# Patient Record
Sex: MALE | Race: WHITE | HISPANIC OR LATINO | URBAN - METROPOLITAN AREA
[De-identification: names, ages, dates, MRNs, and addresses within clinical notes are randomized per-mention and may not be internally consistent; named-entity substitution may affect disease eponyms.]

---

## 2019-12-11 PROBLEM — Z00.00 ENCOUNTER FOR PREVENTIVE HEALTH EXAMINATION: Status: ACTIVE | Noted: 2019-12-11

## 2021-12-01 ENCOUNTER — INPATIENT (INPATIENT)
Facility: HOSPITAL | Age: 36
LOS: 3 days | Discharge: HOME | DRG: 897 | End: 2021-12-05
Attending: INTERNAL MEDICINE | Admitting: INTERNAL MEDICINE
Payer: MEDICAID

## 2021-12-01 VITALS
HEART RATE: 120 BPM | OXYGEN SATURATION: 97 % | SYSTOLIC BLOOD PRESSURE: 132 MMHG | TEMPERATURE: 99 F | DIASTOLIC BLOOD PRESSURE: 75 MMHG | RESPIRATION RATE: 18 BRPM | HEIGHT: 70 IN | WEIGHT: 182.1 LBS

## 2021-12-01 LAB
ALBUMIN SERPL ELPH-MCNC: 3.8 G/DL — SIGNIFICANT CHANGE UP (ref 3.5–5)
ALP SERPL-CCNC: 40 U/L — SIGNIFICANT CHANGE UP (ref 40–120)
ALT FLD-CCNC: 38 U/L DA — SIGNIFICANT CHANGE UP (ref 10–60)
ANION GAP SERPL CALC-SCNC: 11 MMOL/L — SIGNIFICANT CHANGE UP (ref 5–17)
APAP SERPL-MCNC: <2 UG/ML — LOW (ref 10–30)
AST SERPL-CCNC: 42 U/L — HIGH (ref 10–40)
BASOPHILS # BLD AUTO: 0.05 K/UL — SIGNIFICANT CHANGE UP (ref 0–0.2)
BASOPHILS NFR BLD AUTO: 0.5 % — SIGNIFICANT CHANGE UP (ref 0–2)
BILIRUB SERPL-MCNC: 0.7 MG/DL — SIGNIFICANT CHANGE UP (ref 0.2–1.2)
BUN SERPL-MCNC: 11 MG/DL — SIGNIFICANT CHANGE UP (ref 7–18)
CALCIUM SERPL-MCNC: 9 MG/DL — SIGNIFICANT CHANGE UP (ref 8.4–10.5)
CHLORIDE SERPL-SCNC: 103 MMOL/L — SIGNIFICANT CHANGE UP (ref 96–108)
CO2 SERPL-SCNC: 24 MMOL/L — SIGNIFICANT CHANGE UP (ref 22–31)
CREAT SERPL-MCNC: 0.9 MG/DL — SIGNIFICANT CHANGE UP (ref 0.5–1.3)
EOSINOPHIL # BLD AUTO: 0 K/UL — SIGNIFICANT CHANGE UP (ref 0–0.5)
EOSINOPHIL NFR BLD AUTO: 0 % — SIGNIFICANT CHANGE UP (ref 0–6)
ETHANOL SERPL-MCNC: 47 MG/DL — HIGH (ref 0–10)
GLUCOSE SERPL-MCNC: 93 MG/DL — SIGNIFICANT CHANGE UP (ref 70–99)
HCT VFR BLD CALC: 41.2 % — SIGNIFICANT CHANGE UP (ref 39–50)
HGB BLD-MCNC: 14.6 G/DL — SIGNIFICANT CHANGE UP (ref 13–17)
IMM GRANULOCYTES NFR BLD AUTO: 0.5 % — SIGNIFICANT CHANGE UP (ref 0–1.5)
LYMPHOCYTES # BLD AUTO: 2.31 K/UL — SIGNIFICANT CHANGE UP (ref 1–3.3)
LYMPHOCYTES # BLD AUTO: 24.8 % — SIGNIFICANT CHANGE UP (ref 13–44)
MAGNESIUM SERPL-MCNC: 1.8 MG/DL — SIGNIFICANT CHANGE UP (ref 1.6–2.6)
MCHC RBC-ENTMCNC: 31.6 PG — SIGNIFICANT CHANGE UP (ref 27–34)
MCHC RBC-ENTMCNC: 35.4 GM/DL — SIGNIFICANT CHANGE UP (ref 32–36)
MCV RBC AUTO: 89.2 FL — SIGNIFICANT CHANGE UP (ref 80–100)
MONOCYTES # BLD AUTO: 0.87 K/UL — SIGNIFICANT CHANGE UP (ref 0–0.9)
MONOCYTES NFR BLD AUTO: 9.3 % — SIGNIFICANT CHANGE UP (ref 2–14)
NEUTROPHILS # BLD AUTO: 6.05 K/UL — SIGNIFICANT CHANGE UP (ref 1.8–7.4)
NEUTROPHILS NFR BLD AUTO: 64.9 % — SIGNIFICANT CHANGE UP (ref 43–77)
NRBC # BLD: 0 /100 WBCS — SIGNIFICANT CHANGE UP (ref 0–0)
PLATELET # BLD AUTO: 232 K/UL — SIGNIFICANT CHANGE UP (ref 150–400)
POTASSIUM SERPL-MCNC: 3.3 MMOL/L — LOW (ref 3.5–5.3)
POTASSIUM SERPL-SCNC: 3.3 MMOL/L — LOW (ref 3.5–5.3)
PROT SERPL-MCNC: 7.8 G/DL — SIGNIFICANT CHANGE UP (ref 6–8.3)
RBC # BLD: 4.62 M/UL — SIGNIFICANT CHANGE UP (ref 4.2–5.8)
RBC # FLD: 12.9 % — SIGNIFICANT CHANGE UP (ref 10.3–14.5)
SALICYLATES SERPL-MCNC: <1.7 MG/DL — LOW (ref 2.8–20)
SODIUM SERPL-SCNC: 138 MMOL/L — SIGNIFICANT CHANGE UP (ref 135–145)
WBC # BLD: 9.33 K/UL — SIGNIFICANT CHANGE UP (ref 3.8–10.5)
WBC # FLD AUTO: 9.33 K/UL — SIGNIFICANT CHANGE UP (ref 3.8–10.5)

## 2021-12-01 PROCEDURE — 99285 EMERGENCY DEPT VISIT HI MDM: CPT | Mod: CS

## 2021-12-01 RX ORDER — SODIUM CHLORIDE 9 MG/ML
1000 INJECTION INTRAMUSCULAR; INTRAVENOUS; SUBCUTANEOUS ONCE
Refills: 0 | Status: COMPLETED | OUTPATIENT
Start: 2021-12-01 | End: 2021-12-01

## 2021-12-01 RX ADMIN — SODIUM CHLORIDE 1000 MILLILITER(S): 9 INJECTION INTRAMUSCULAR; INTRAVENOUS; SUBCUTANEOUS at 22:47

## 2021-12-01 RX ADMIN — SODIUM CHLORIDE 1000 MILLILITER(S): 9 INJECTION INTRAMUSCULAR; INTRAVENOUS; SUBCUTANEOUS at 21:47

## 2021-12-01 RX ADMIN — Medication 2 MILLIGRAM(S): at 23:08

## 2021-12-01 NOTE — ED ADULT TRIAGE NOTE - CCCP TRG CHIEF CMPLNT
c/o palpitations /anxiety attack '' I have been drinking a lot lately  and feels dehydrated'' denies suicidal thoughts/palpitations

## 2021-12-01 NOTE — ED PROVIDER NOTE - CLINICAL SUMMARY MEDICAL DECISION MAKING FREE TEXT BOX
Patient presenting with tremor and palpitation. states used meth but say it was his friend breathing it towards him. no meth usage himself. clinically likely withdrawal., will obtain lab, treat withdrawal, reassess

## 2021-12-01 NOTE — ED PROVIDER NOTE - OBJECTIVE STATEMENT
35 y.o w/ pmh of alcohol abuse presenting with palpitation and anxiety. denies si/hi. endorses drinking less today than usual. denies n, v, abd pain, fever, chills. endorses also using meth today.

## 2021-12-01 NOTE — ED ADULT TRIAGE NOTE - CHIEF COMPLAINT QUOTE
c/o palpitations /anxiety attack /'' I have been drinking  a lot lately and feels dehydrated ''denies suicidal thoughts

## 2021-12-01 NOTE — ED ADULT NURSE NOTE - NSIMPLEMENTINTERV_GEN_ALL_ED
Implemented All Universal Safety Interventions:  [place] to call system. Call bell, personal items and telephone within reach. Instruct patient to call for assistance. Room bathroom lighting operational. Non-slip footwear when patient is off stretcher. Physically safe environment: no spills, clutter or unnecessary equipment. Stretcher in lowest position, wheels locked, appropriate side rails in place.

## 2021-12-01 NOTE — ED ADULT NURSE NOTE - OBJECTIVE STATEMENT
Pt. c/o palpitations and feeling "like my mouth is dry and I am dehydrated" after taking meth today for the first time. Pt. stated "I think I am having a panic attack where I feel my heart beating fast". Pt. admits to drinking alcohol every day "about a bottle of vodka". Last drink was at 11 today. "I am trying to stop".

## 2021-12-02 DIAGNOSIS — F10.230 ALCOHOL DEPENDENCE WITH WITHDRAWAL, UNCOMPLICATED: ICD-10-CM

## 2021-12-02 DIAGNOSIS — Z29.9 ENCOUNTER FOR PROPHYLACTIC MEASURES, UNSPECIFIED: ICD-10-CM

## 2021-12-02 DIAGNOSIS — F10.10 ALCOHOL ABUSE, UNCOMPLICATED: ICD-10-CM

## 2021-12-02 DIAGNOSIS — F15.10 OTHER STIMULANT ABUSE, UNCOMPLICATED: ICD-10-CM

## 2021-12-02 DIAGNOSIS — E80.7 DISORDER OF BILIRUBIN METABOLISM, UNSPECIFIED: ICD-10-CM

## 2021-12-02 DIAGNOSIS — F32.9 MAJOR DEPRESSIVE DISORDER, SINGLE EPISODE, UNSPECIFIED: ICD-10-CM

## 2021-12-02 LAB
ALBUMIN SERPL ELPH-MCNC: 3.3 G/DL — LOW (ref 3.5–5)
ALP SERPL-CCNC: 36 U/L — LOW (ref 40–120)
ALT FLD-CCNC: 31 U/L DA — SIGNIFICANT CHANGE UP (ref 10–60)
AMPHET UR-MCNC: POSITIVE
ANION GAP SERPL CALC-SCNC: 9 MMOL/L — SIGNIFICANT CHANGE UP (ref 5–17)
AST SERPL-CCNC: 35 U/L — SIGNIFICANT CHANGE UP (ref 10–40)
BARBITURATES UR SCN-MCNC: NEGATIVE — SIGNIFICANT CHANGE UP
BASOPHILS # BLD AUTO: 0.03 K/UL — SIGNIFICANT CHANGE UP (ref 0–0.2)
BASOPHILS NFR BLD AUTO: 0.5 % — SIGNIFICANT CHANGE UP (ref 0–2)
BENZODIAZ UR-MCNC: NEGATIVE — SIGNIFICANT CHANGE UP
BILIRUB DIRECT SERPL-MCNC: 0.3 MG/DL — SIGNIFICANT CHANGE UP (ref 0–0.3)
BILIRUB INDIRECT FLD-MCNC: 1 MG/DL — SIGNIFICANT CHANGE UP (ref 0.2–1)
BILIRUB SERPL-MCNC: 1.3 MG/DL — HIGH (ref 0.2–1.2)
BILIRUB SERPL-MCNC: 1.3 MG/DL — HIGH (ref 0.2–1.2)
BUN SERPL-MCNC: 9 MG/DL — SIGNIFICANT CHANGE UP (ref 7–18)
CALCIUM SERPL-MCNC: 8.5 MG/DL — SIGNIFICANT CHANGE UP (ref 8.4–10.5)
CHLORIDE SERPL-SCNC: 105 MMOL/L — SIGNIFICANT CHANGE UP (ref 96–108)
CO2 SERPL-SCNC: 27 MMOL/L — SIGNIFICANT CHANGE UP (ref 22–31)
COCAINE METAB.OTHER UR-MCNC: NEGATIVE — SIGNIFICANT CHANGE UP
CREAT SERPL-MCNC: 0.66 MG/DL — SIGNIFICANT CHANGE UP (ref 0.5–1.3)
EOSINOPHIL # BLD AUTO: 0.03 K/UL — SIGNIFICANT CHANGE UP (ref 0–0.5)
EOSINOPHIL NFR BLD AUTO: 0.5 % — SIGNIFICANT CHANGE UP (ref 0–6)
GGT SERPL-CCNC: 80 U/L — HIGH (ref 9–50)
GLUCOSE SERPL-MCNC: 90 MG/DL — SIGNIFICANT CHANGE UP (ref 70–99)
HAV IGM SER-ACNC: SIGNIFICANT CHANGE UP
HBV CORE IGM SER-ACNC: SIGNIFICANT CHANGE UP
HBV SURFACE AG SER-ACNC: SIGNIFICANT CHANGE UP
HCT VFR BLD CALC: 40.8 % — SIGNIFICANT CHANGE UP (ref 39–50)
HCV AB S/CO SERPL IA: 0.15 S/CO — SIGNIFICANT CHANGE UP (ref 0–0.99)
HCV AB SERPL-IMP: SIGNIFICANT CHANGE UP
HGB BLD-MCNC: 14.1 G/DL — SIGNIFICANT CHANGE UP (ref 13–17)
IMM GRANULOCYTES NFR BLD AUTO: 0.4 % — SIGNIFICANT CHANGE UP (ref 0–1.5)
LYMPHOCYTES # BLD AUTO: 1.54 K/UL — SIGNIFICANT CHANGE UP (ref 1–3.3)
LYMPHOCYTES # BLD AUTO: 27.8 % — SIGNIFICANT CHANGE UP (ref 13–44)
MAGNESIUM SERPL-MCNC: 2.1 MG/DL — SIGNIFICANT CHANGE UP (ref 1.6–2.6)
MCHC RBC-ENTMCNC: 31.8 PG — SIGNIFICANT CHANGE UP (ref 27–34)
MCHC RBC-ENTMCNC: 34.6 GM/DL — SIGNIFICANT CHANGE UP (ref 32–36)
MCV RBC AUTO: 92.1 FL — SIGNIFICANT CHANGE UP (ref 80–100)
METHADONE UR-MCNC: NEGATIVE — SIGNIFICANT CHANGE UP
MONOCYTES # BLD AUTO: 0.67 K/UL — SIGNIFICANT CHANGE UP (ref 0–0.9)
MONOCYTES NFR BLD AUTO: 12.1 % — SIGNIFICANT CHANGE UP (ref 2–14)
NEUTROPHILS # BLD AUTO: 3.24 K/UL — SIGNIFICANT CHANGE UP (ref 1.8–7.4)
NEUTROPHILS NFR BLD AUTO: 58.7 % — SIGNIFICANT CHANGE UP (ref 43–77)
NRBC # BLD: 0 /100 WBCS — SIGNIFICANT CHANGE UP (ref 0–0)
OPIATES UR-MCNC: NEGATIVE — SIGNIFICANT CHANGE UP
PCP SPEC-MCNC: SIGNIFICANT CHANGE UP
PCP UR-MCNC: NEGATIVE — SIGNIFICANT CHANGE UP
PHOSPHATE SERPL-MCNC: 3 MG/DL — SIGNIFICANT CHANGE UP (ref 2.5–4.5)
PLATELET # BLD AUTO: 182 K/UL — SIGNIFICANT CHANGE UP (ref 150–400)
POTASSIUM SERPL-MCNC: 3.8 MMOL/L — SIGNIFICANT CHANGE UP (ref 3.5–5.3)
POTASSIUM SERPL-SCNC: 3.8 MMOL/L — SIGNIFICANT CHANGE UP (ref 3.5–5.3)
PROT SERPL-MCNC: 6.8 G/DL — SIGNIFICANT CHANGE UP (ref 6–8.3)
RBC # BLD: 4.43 M/UL — SIGNIFICANT CHANGE UP (ref 4.2–5.8)
RBC # FLD: 12.9 % — SIGNIFICANT CHANGE UP (ref 10.3–14.5)
SARS-COV-2 RNA SPEC QL NAA+PROBE: SIGNIFICANT CHANGE UP
SODIUM SERPL-SCNC: 141 MMOL/L — SIGNIFICANT CHANGE UP (ref 135–145)
THC UR QL: POSITIVE
WBC # BLD: 5.53 K/UL — SIGNIFICANT CHANGE UP (ref 3.8–10.5)
WBC # FLD AUTO: 5.53 K/UL — SIGNIFICANT CHANGE UP (ref 3.8–10.5)

## 2021-12-02 PROCEDURE — 12345: CPT | Mod: NC,GC

## 2021-12-02 PROCEDURE — 99222 1ST HOSP IP/OBS MODERATE 55: CPT

## 2021-12-02 PROCEDURE — 76705 ECHO EXAM OF ABDOMEN: CPT | Mod: 26

## 2021-12-02 RX ORDER — THIAMINE MONONITRATE (VIT B1) 100 MG
500 TABLET ORAL EVERY 8 HOURS
Refills: 0 | Status: COMPLETED | OUTPATIENT
Start: 2021-12-02 | End: 2021-12-04

## 2021-12-02 RX ORDER — PANTOPRAZOLE SODIUM 20 MG/1
40 TABLET, DELAYED RELEASE ORAL
Refills: 0 | Status: DISCONTINUED | OUTPATIENT
Start: 2021-12-02 | End: 2021-12-05

## 2021-12-02 RX ORDER — SODIUM CHLORIDE 9 MG/ML
1000 INJECTION, SOLUTION INTRAVENOUS
Refills: 0 | Status: DISCONTINUED | OUTPATIENT
Start: 2021-12-02 | End: 2021-12-04

## 2021-12-02 RX ORDER — INFLUENZA VIRUS VACCINE 15; 15; 15; 15 UG/.5ML; UG/.5ML; UG/.5ML; UG/.5ML
0.5 SUSPENSION INTRAMUSCULAR ONCE
Refills: 0 | Status: DISCONTINUED | OUTPATIENT
Start: 2021-12-02 | End: 2021-12-05

## 2021-12-02 RX ORDER — THIAMINE MONONITRATE (VIT B1) 100 MG
100 TABLET ORAL DAILY
Refills: 0 | Status: DISCONTINUED | OUTPATIENT
Start: 2021-12-02 | End: 2021-12-02

## 2021-12-02 RX ORDER — FLUOXETINE HCL 10 MG
1 CAPSULE ORAL
Qty: 0 | Refills: 0 | DISCHARGE

## 2021-12-02 RX ORDER — MULTIVIT-MIN/FERROUS GLUCONATE 9 MG/15 ML
1 LIQUID (ML) ORAL DAILY
Refills: 0 | Status: DISCONTINUED | OUTPATIENT
Start: 2021-12-02 | End: 2021-12-05

## 2021-12-02 RX ORDER — SODIUM CHLORIDE 9 MG/ML
1000 INJECTION, SOLUTION INTRAVENOUS
Refills: 0 | Status: DISCONTINUED | OUTPATIENT
Start: 2021-12-02 | End: 2021-12-02

## 2021-12-02 RX ORDER — FOLIC ACID 0.8 MG
1 TABLET ORAL DAILY
Refills: 0 | Status: DISCONTINUED | OUTPATIENT
Start: 2021-12-02 | End: 2021-12-05

## 2021-12-02 RX ADMIN — PANTOPRAZOLE SODIUM 40 MILLIGRAM(S): 20 TABLET, DELAYED RELEASE ORAL at 06:15

## 2021-12-02 RX ADMIN — Medication 1 MILLIGRAM(S): at 13:23

## 2021-12-02 RX ADMIN — Medication 105 MILLIGRAM(S): at 13:22

## 2021-12-02 RX ADMIN — Medication 105 MILLIGRAM(S): at 06:15

## 2021-12-02 RX ADMIN — Medication 105 MILLIGRAM(S): at 22:01

## 2021-12-02 RX ADMIN — SODIUM CHLORIDE 125 MILLILITER(S): 9 INJECTION, SOLUTION INTRAVENOUS at 06:15

## 2021-12-02 RX ADMIN — Medication 50 MILLIGRAM(S): at 13:22

## 2021-12-02 RX ADMIN — SODIUM CHLORIDE 150 MILLILITER(S): 9 INJECTION, SOLUTION INTRAVENOUS at 02:51

## 2021-12-02 RX ADMIN — Medication 50 MILLIGRAM(S): at 00:47

## 2021-12-02 RX ADMIN — Medication 2 MILLIGRAM(S): at 09:32

## 2021-12-02 RX ADMIN — SODIUM CHLORIDE 125 MILLILITER(S): 9 INJECTION, SOLUTION INTRAVENOUS at 22:01

## 2021-12-02 RX ADMIN — Medication 50 MILLIGRAM(S): at 06:15

## 2021-12-02 RX ADMIN — Medication 50 MILLIGRAM(S): at 17:54

## 2021-12-02 RX ADMIN — Medication 1 TABLET(S): at 13:22

## 2021-12-02 NOTE — H&P ADULT - PROBLEM SELECTOR PLAN 2
Pt endorses smoking meth around 11am on 12/1  developed generalized anxiety, palpitations, nausea, vomiting, headache after  also uses Fluoxetine at home  monitor for serotonin syndrome  would hold Fluoxetine for 48 hrs until Meth clears

## 2021-12-02 NOTE — PROGRESS NOTE ADULT - SUBJECTIVE AND OBJECTIVE BOX
PGY-1 Progress Note discussed with attending    PAGER #: [[phone]] TILL 5:00 PM  PLEASE CONTACT ON CALL TEAM:  - On Call Team (Please refer to Buffy) FROM 5:00 PM - 8:30PM  - Nightfloat Team FROM 8:30 -7:30 AM      INTERVAL HPI/OVERNIGHT EVENTS:       REVIEW OF SYSTEMS:  CONSTITUTIONAL: No fever, weight loss, or fatigue  RESPIRATORY: No cough, wheezing, chills or hemoptysis; No shortness of breath  CARDIOVASCULAR: No chest pain, palpitations, dizziness, or leg swelling  GASTROINTESTINAL: No abdominal pain. No nausea, vomiting, or hematemesis; No diarrhea or constipation. No melena or hematochezia.  GENITOURINARY: No dysuria or hematuria, urinary frequency  NEUROLOGICAL: No headaches, memory loss, loss of strength, numbness, or tremors  SKIN: No itching, burning, rashes, or lesions     Vital Signs Last 24 Hrs  T(C): 36.7 (02 Dec 2021 04:59), Max: 37.2 (01 Dec 2021 21:07)  T(F): 98.1 (02 Dec 2021 04:59), Max: 99 (01 Dec 2021 21:07)  HR: 102 (02 Dec 2021 04:59) (102 - 120)  BP: 98/64 (02 Dec 2021 04:59) (98/64 - 132/75)  BP(mean): --  RR: 18 (02 Dec 2021 04:59) (18 - 18)  SpO2: 99% (02 Dec 2021 04:59) (97% - 99%)    PHYSICAL EXAMINATION:  GENERAL: NAD, well built  HEAD:  Atraumatic, Normocephalic  EYES:  conjunctiva and sclera clear  NECK: Supple, No JVD, Normal thyroid  CHEST/LUNG: Clear to auscultation. Clear to percussion bilaterally; No rales, rhonchi, wheezing, or rubs  HEART: Regular rate and rhythm; No murmurs, rubs, or gallops  ABDOMEN: Soft, Nontender, Nondistended; Bowel sounds present  NERVOUS SYSTEM:  Alert & Oriented X3,    EXTREMITIES:  2+ Peripheral Pulses, No clubbing, cyanosis, or edema  SKIN: warm dry                          14.1   5.53  )-----------( 182      ( 02 Dec 2021 06:57 )             40.8     12-02    141  |  105  |  9   ----------------------------<  90  3.8   |  27  |  0.66    Ca    8.5      02 Dec 2021 06:57  Phos  3.0     12-02  Mg     2.1     12-02    TPro  6.8  /  Alb  3.3<L>  /  TBili  1.3<H>  /  DBili  0.3  /  AST  35  /  ALT  31  /  AlkPhos  36<L>  12-02    LIVER FUNCTIONS - ( 02 Dec 2021 06:57 )  Alb: 3.3 g/dL / Pro: 6.8 g/dL / ALK PHOS: 36 U/L / ALT: 31 U/L DA / AST: 35 U/L / GGT: x                   CAPILLARY BLOOD GLUCOSE      RADIOLOGY & ADDITIONAL TESTS:                   PGY-1 Progress Note discussed with attending    PAGER #: [[phone]] TILL 5:00 PM  PLEASE CONTACT ON CALL TEAM:  - On Call Team (Please refer to Buffy) FROM 5:00 PM - 8:30PM  - Nightfloat Team FROM 8:30 -7:30 AM      INTERVAL HPI/OVERNIGHT EVENTS:  No overnight events. Pt see and examined by bedside. No HA, denies N/V, abdominal pain, denies palpitations, no longer feeling anxious.  Denies auditory, visual  hallucinations      REVIEW OF SYSTEMS:  CONSTITUTIONAL: No fever, weight loss, or fatigue  RESPIRATORY: No cough, wheezing, chills or hemoptysis; No shortness of breath  CARDIOVASCULAR: No chest pain, palpitations, dizziness, or leg swelling  GASTROINTESTINAL: No abdominal pain. No nausea, vomiting, or hematemesis; No diarrhea or constipation. No melena or hematochezia.  GENITOURINARY: No dysuria or hematuria, urinary frequency  NEUROLOGICAL: No headaches, memory loss, loss of strength, numbness, or tremors  SKIN: No itching, burning, rashes, or lesions     Vital Signs Last 24 Hrs  T(C): 36.7 (02 Dec 2021 04:59), Max: 37.2 (01 Dec 2021 21:07)  T(F): 98.1 (02 Dec 2021 04:59), Max: 99 (01 Dec 2021 21:07)  HR: 102 (02 Dec 2021 04:59) (102 - 120)  BP: 98/64 (02 Dec 2021 04:59) (98/64 - 132/75)  BP(mean): --  RR: 18 (02 Dec 2021 04:59) (18 - 18)  SpO2: 99% (02 Dec 2021 04:59) (97% - 99%)    PHYSICAL EXAMINATION:  GENERAL: NAD, well built  HEAD:  Atraumatic, Normocephalic  EYES:  conjunctiva and sclera clear  NECK: Supple, No JVD  CHEST/LUNG: Clear to auscultation. Clear to percussion bilaterally; No rales, rhonchi, wheezing, or rubs  HEART: Regular rate and rhythm; No murmurs, rubs, or gallops  ABDOMEN: Soft, Nontender, Nondistended; Bowel sounds present  NERVOUS SYSTEM:  Alert & Oriented X3,  mild tremor on his hands   EXTREMITIES:  2+ Peripheral Pulses, No clubbing, cyanosis, or edema  SKIN: warm dry                          14.1   5.53  )-----------( 182      ( 02 Dec 2021 06:57 )             40.8     12-02    141  |  105  |  9   ----------------------------<  90  3.8   |  27  |  0.66    Ca    8.5      02 Dec 2021 06:57  Phos  3.0     12-02  Mg     2.1     12-02    TPro  6.8  /  Alb  3.3<L>  /  TBili  1.3<H>  /  DBili  0.3  /  AST  35  /  ALT  31  /  AlkPhos  36<L>  12-02    LIVER FUNCTIONS - ( 02 Dec 2021 06:57 )  Alb: 3.3 g/dL / Pro: 6.8 g/dL / ALK PHOS: 36 U/L / ALT: 31 U/L DA / AST: 35 U/L / GGT: x                   CAPILLARY BLOOD GLUCOSE      RADIOLOGY & ADDITIONAL TESTS:

## 2021-12-02 NOTE — H&P ADULT - ATTENDING COMMENTS
Pt seen at bedside  Case discussed with MAR.  35 year old woman with PMH of chronic alcohol abuse, amphetamine abuse, clinical depression, ELIDA who presented to the ED on account of palpitations, anxiety, emesis since reducing his alcohol intake in the last day. Denies seizures or other symptoms on ROS.    Vital Signs Last 24 Hrs  T(C): 36.7 (02 Dec 2021 04:59), Max: 37.2 (01 Dec 2021 21:07)  T(F): 98.1 (02 Dec 2021 04:59), Max: 99 (01 Dec 2021 21:07)  HR: 102 (02 Dec 2021 04:59) (102 - 120)  BP: 98/64 (02 Dec 2021 04:59) (98/64 - 132/75)  RR: 18 (02 Dec 2021 04:59) (18 - 18)  SpO2: 99% (02 Dec 2021 04:59) (97% - 99%)    Labs                        14.6   9.33  )-----------( 232      ( 01 Dec 2021 21:50 )             41.2     12-01    138  |  103  |  11  ----------------------------<  93  3.3<L>   |  24  |  0.90    Ca    9.0      01 Dec 2021 21:50  Mg     1.8     12-01    TPro  7.8  /  Alb  3.8  /  TBili  0.7  /  DBili  x   /  AST  42<H>  /  ALT  38  /  AlkPhos  40  12-01    UTOX  - THC, amphetamine +VE  Alc - 47    CT abd - unremarkable liver and abdomen    Impression   - Acute alcohol withdrawal  - Chronic alcohol abuse  - Clinical depression   - ELIDA    Plan   Admit to Medicine   [facility] protocol   Thiamine 500mg IV q 8 hourly X 3 - 5 days  D5NS @ 100cc/hour   MVI including folate replacement  Alcohol / meth cessation counselling     Other plans as above

## 2021-12-02 NOTE — PATIENT PROFILE ADULT - FALL HARM RISK - UNIVERSAL INTERVENTIONS
Bed in lowest position, wheels locked, appropriate side rails in place/Call bell, personal items and telephone in reach/Instruct patient to call for assistance before getting out of bed or chair/Non-slip footwear when patient is out of bed/[place] to call system/Physically safe environment - no spills, clutter or unnecessary equipment/Purposeful Proactive Rounding/Room/bathroom lighting operational, light cord in reach

## 2021-12-02 NOTE — H&P ADULT - NSHPPHYSICALEXAM_GEN_ALL_CORE
GENERAL: NAD, well-groomed, well-developed  HEAD:  Atraumatic, Normocephalic  EYES: EOMI, PERRLA, conjunctiva and sclera clear  ENMT: No tonsillar erythema, exudates, or enlargement; Moist mucous membranes, Good dentition, No lesions  NECK: Supple, normal appearance, No JVD; Normal thyroid; Trachea midline  NERVOUS SYSTEM:  Alert & Oriented X3,  Motor Strength 5/5 B/L upper and lower extremities, sensation intact  CHEST/LUNG: Lungs clear to auscultation bilaterally, No rales, rhonchi, wheezing   HEART: Regular rate and rhythm; No murmurs, rubs, or gallops  ABDOMEN: Soft, Nontender, Nondistended; Bowel sounds present  EXTREMITIES:  2+ Peripheral Pulses, No clubbing, cyanosis, or edema  LYMPH: No lymphadenopathy noted  SKIN: No rashes or lesions;  Good capillary refill

## 2021-12-02 NOTE — H&P ADULT - HISTORY OF PRESENT ILLNESS
Pt is a 34 y/o F with hx of general anxiety and depression who presented to the hospital with complains of Nausea, vomiting, palpitations and general anxiety. Patient endorses that he has been drinking heavily because of the pandemic, and in general has been trying to cut back, but for the last couple of days he has been drinking around 1L of vodka per day, last drink 12/1 during the day, and around 11am on 12/1 he also smoked meth which he had never done before. Because of this he experienced generalized anxiety, palpitations, headache, nausea and vomiting. He also states that he feels dehydrated. Denies any other complaints.     In Ed: BAL 47, Utox + for amphetamines and thc, was given librium and ativan and 1L bolus.

## 2021-12-02 NOTE — H&P ADULT - NSICDXFAMILYHX_GEN_ALL_CORE_FT
FAMILY HISTORY:  Father  Still living? Unknown  FH: hypercholesterolemia, Age at diagnosis: Age Unknown    Mother  Still living? Unknown  FHx: diabetes mellitus, Age at diagnosis: Age Unknown

## 2021-12-02 NOTE — H&P ADULT - PROBLEM SELECTOR PLAN 3
100% of the time
pt on Fluoxetine at home  recent inhalation of Meth  monitor for serotonin syndrome  would hold Fluoxetine for 48 hrs until Meth clears

## 2021-12-02 NOTE — H&P ADULT - NSHPREVIEWOFSYSTEMS_GEN_ALL_CORE
CONSTITUTIONAL: No fever, weight loss, or fatigue  EYES: No eye pain, visual disturbances, or discharge  ENT:  No difficulty hearing, tinnitus, vertigo; No sinus or throat pain  NECK: No pain or stiffness  RESPIRATORY: No cough, wheezing, chills or hemoptysis; No Shortness of Breath  CARDIOVASCULAR: + palpitations, No chest pain,  passing out, dizziness, or leg swelling  GASTROINTESTINAL: No abdominal or epigastric pain. + nausea, vomiting, no hematemesis; No diarrhea or constipation. No melena or hematochezia.  GENITOURINARY: No dysuria, frequency, hematuria, or incontinence  NEUROLOGICAL: + headaches, No memory loss, loss of strength, numbness, or tremors  SKIN: No itching, burning, rashes, or lesions   LYMPH Nodes: No enlarged glands  ENDOCRINE: No heat or cold intolerance; No hair loss  MUSCULOSKELETAL: No joint pain or swelling; No muscle, back, No extremity pain  PSYCHIATRIC: + anxiety. No depression, mood swings, + difficulty sleeping  HEME/LYMPH: No easy bruising, or bleeding gums  ALLERGY AND IMMUNOLOGIC: No hives or eczema

## 2021-12-02 NOTE — H&P ADULT - PROBLEM SELECTOR PLAN 1
Pt with Hx of daily alcohol use  last drink 1 day PTA  BAL 47  CIWA: 6, (anxious, headache, N/v)  S/p Librium and ativan in ED  Will continue ciwa protocol with librium taper and ativan prn  thiamine, folic acid, mv  IV fluids

## 2021-12-02 NOTE — H&P ADULT - ASSESSMENT
Pt is a 34 y/o F with hx of general anxiety and depression who presented to the hospital with complains of Nausea, vomiting, palpitations and general anxiety. Admitted for alcohol withdrawal and Meth intoxication.

## 2021-12-02 NOTE — PROGRESS NOTE ADULT - ATTENDING COMMENTS
Patient seen and examined this afternoon;    Patient admitted with Alcohol withdrawal and Polysubstance abuse on CIWA protocol stable, comfortably resting, not agitated  Hx Major depression    Plan:  [facility] protocol; IV Thiamine  PO Folate and Multivitamin  Continue to hold Fluoxetine for now;   Follow up RUQ Sono; likely Hepatic steatosis    rest as per PGY1 above  Discussed with PGY1 and PGY2

## 2021-12-03 LAB
ALBUMIN SERPL ELPH-MCNC: 3.2 G/DL — LOW (ref 3.5–5)
ALP SERPL-CCNC: 35 U/L — LOW (ref 40–120)
ALT FLD-CCNC: 30 U/L DA — SIGNIFICANT CHANGE UP (ref 10–60)
ANION GAP SERPL CALC-SCNC: 7 MMOL/L — SIGNIFICANT CHANGE UP (ref 5–17)
AST SERPL-CCNC: 31 U/L — SIGNIFICANT CHANGE UP (ref 10–40)
BASOPHILS # BLD AUTO: 0.01 K/UL — SIGNIFICANT CHANGE UP (ref 0–0.2)
BASOPHILS NFR BLD AUTO: 0.2 % — SIGNIFICANT CHANGE UP (ref 0–2)
BILIRUB SERPL-MCNC: 1.2 MG/DL — SIGNIFICANT CHANGE UP (ref 0.2–1.2)
BUN SERPL-MCNC: 6 MG/DL — LOW (ref 7–18)
CALCIUM SERPL-MCNC: 8.4 MG/DL — SIGNIFICANT CHANGE UP (ref 8.4–10.5)
CHLORIDE SERPL-SCNC: 106 MMOL/L — SIGNIFICANT CHANGE UP (ref 96–108)
CO2 SERPL-SCNC: 28 MMOL/L — SIGNIFICANT CHANGE UP (ref 22–31)
CREAT SERPL-MCNC: 0.7 MG/DL — SIGNIFICANT CHANGE UP (ref 0.5–1.3)
EOSINOPHIL # BLD AUTO: 0.06 K/UL — SIGNIFICANT CHANGE UP (ref 0–0.5)
EOSINOPHIL NFR BLD AUTO: 1 % — SIGNIFICANT CHANGE UP (ref 0–6)
GLUCOSE SERPL-MCNC: 114 MG/DL — HIGH (ref 70–99)
HCT VFR BLD CALC: 40.3 % — SIGNIFICANT CHANGE UP (ref 39–50)
HGB BLD-MCNC: 14.4 G/DL — SIGNIFICANT CHANGE UP (ref 13–17)
IMM GRANULOCYTES NFR BLD AUTO: 0.7 % — SIGNIFICANT CHANGE UP (ref 0–1.5)
LYMPHOCYTES # BLD AUTO: 1.1 K/UL — SIGNIFICANT CHANGE UP (ref 1–3.3)
LYMPHOCYTES # BLD AUTO: 19.2 % — SIGNIFICANT CHANGE UP (ref 13–44)
MAGNESIUM SERPL-MCNC: 2.2 MG/DL — SIGNIFICANT CHANGE UP (ref 1.6–2.6)
MCHC RBC-ENTMCNC: 32.5 PG — SIGNIFICANT CHANGE UP (ref 27–34)
MCHC RBC-ENTMCNC: 35.7 GM/DL — SIGNIFICANT CHANGE UP (ref 32–36)
MCV RBC AUTO: 91 FL — SIGNIFICANT CHANGE UP (ref 80–100)
MONOCYTES # BLD AUTO: 0.51 K/UL — SIGNIFICANT CHANGE UP (ref 0–0.9)
MONOCYTES NFR BLD AUTO: 8.9 % — SIGNIFICANT CHANGE UP (ref 2–14)
NEUTROPHILS # BLD AUTO: 4.02 K/UL — SIGNIFICANT CHANGE UP (ref 1.8–7.4)
NEUTROPHILS NFR BLD AUTO: 70 % — SIGNIFICANT CHANGE UP (ref 43–77)
NRBC # BLD: 0 /100 WBCS — SIGNIFICANT CHANGE UP (ref 0–0)
PHOSPHATE SERPL-MCNC: 2.3 MG/DL — LOW (ref 2.5–4.5)
PLATELET # BLD AUTO: 162 K/UL — SIGNIFICANT CHANGE UP (ref 150–400)
POTASSIUM SERPL-MCNC: 3.3 MMOL/L — LOW (ref 3.5–5.3)
POTASSIUM SERPL-SCNC: 3.3 MMOL/L — LOW (ref 3.5–5.3)
PROT SERPL-MCNC: 6.8 G/DL — SIGNIFICANT CHANGE UP (ref 6–8.3)
RBC # BLD: 4.43 M/UL — SIGNIFICANT CHANGE UP (ref 4.2–5.8)
RBC # FLD: 12.6 % — SIGNIFICANT CHANGE UP (ref 10.3–14.5)
SODIUM SERPL-SCNC: 141 MMOL/L — SIGNIFICANT CHANGE UP (ref 135–145)
WBC # BLD: 5.74 K/UL — SIGNIFICANT CHANGE UP (ref 3.8–10.5)
WBC # FLD AUTO: 5.74 K/UL — SIGNIFICANT CHANGE UP (ref 3.8–10.5)

## 2021-12-03 PROCEDURE — 99233 SBSQ HOSP IP/OBS HIGH 50: CPT | Mod: GC

## 2021-12-03 RX ORDER — SODIUM,POTASSIUM PHOSPHATES 278-250MG
1 POWDER IN PACKET (EA) ORAL THREE TIMES A DAY
Refills: 0 | Status: COMPLETED | OUTPATIENT
Start: 2021-12-03 | End: 2021-12-04

## 2021-12-03 RX ORDER — ACETAMINOPHEN 500 MG
650 TABLET ORAL EVERY 6 HOURS
Refills: 0 | Status: DISCONTINUED | OUTPATIENT
Start: 2021-12-03 | End: 2021-12-05

## 2021-12-03 RX ORDER — POTASSIUM CHLORIDE 20 MEQ
40 PACKET (EA) ORAL ONCE
Refills: 0 | Status: COMPLETED | OUTPATIENT
Start: 2021-12-03 | End: 2021-12-03

## 2021-12-03 RX ORDER — FLUOXETINE HCL 10 MG
40 CAPSULE ORAL DAILY
Refills: 0 | Status: DISCONTINUED | OUTPATIENT
Start: 2021-12-03 | End: 2021-12-05

## 2021-12-03 RX ADMIN — SODIUM CHLORIDE 90 MILLILITER(S): 9 INJECTION, SOLUTION INTRAVENOUS at 20:00

## 2021-12-03 RX ADMIN — Medication 105 MILLIGRAM(S): at 22:46

## 2021-12-03 RX ADMIN — Medication 50 MILLIGRAM(S): at 22:45

## 2021-12-03 RX ADMIN — Medication 650 MILLIGRAM(S): at 23:17

## 2021-12-03 RX ADMIN — Medication 650 MILLIGRAM(S): at 22:47

## 2021-12-03 RX ADMIN — Medication 1 MILLIGRAM(S): at 13:29

## 2021-12-03 RX ADMIN — Medication 105 MILLIGRAM(S): at 13:30

## 2021-12-03 RX ADMIN — Medication 1 PACKET(S): at 13:31

## 2021-12-03 RX ADMIN — PANTOPRAZOLE SODIUM 40 MILLIGRAM(S): 20 TABLET, DELAYED RELEASE ORAL at 06:32

## 2021-12-03 RX ADMIN — Medication 50 MILLIGRAM(S): at 06:32

## 2021-12-03 RX ADMIN — Medication 40 MILLIEQUIVALENT(S): at 13:29

## 2021-12-03 RX ADMIN — Medication 105 MILLIGRAM(S): at 06:32

## 2021-12-03 RX ADMIN — Medication 1 PACKET(S): at 22:46

## 2021-12-03 RX ADMIN — Medication 1 PACKET(S): at 13:30

## 2021-12-03 RX ADMIN — Medication 50 MILLIGRAM(S): at 13:29

## 2021-12-03 RX ADMIN — Medication 40 MILLIGRAM(S): at 13:30

## 2021-12-03 RX ADMIN — Medication 1 TABLET(S): at 13:29

## 2021-12-03 NOTE — PROGRESS NOTE ADULT - SUBJECTIVE AND OBJECTIVE BOX
PGY-1 Progress Note discussed with attending    PAGER #: [[phone]] TILL 5:00 PM  PLEASE CONTACT ON CALL TEAM:  - On Call Team (Please refer to Buffy) FROM 5:00 PM - 8:30PM  - Nightfloat Team FROM 8:30 -7:30 AM      INTERVAL HPI/OVERNIGHT EVENTS:       REVIEW OF SYSTEMS:  CONSTITUTIONAL: No fever, weight loss, or fatigue  RESPIRATORY: No cough, wheezing, chills or hemoptysis; No shortness of breath  CARDIOVASCULAR: No chest pain, palpitations, dizziness, or leg swelling  GASTROINTESTINAL: No abdominal pain. No nausea, vomiting, or hematemesis; No diarrhea or constipation. No melena or hematochezia.  GENITOURINARY: No dysuria or hematuria, urinary frequency  NEUROLOGICAL: No headaches, memory loss, loss of strength, numbness, or tremors  SKIN: No itching, burning, rashes, or lesions     Vital Signs Last 24 Hrs  T(C): 36.3 (03 Dec 2021 05:17), Max: 36.5 (02 Dec 2021 20:38)  T(F): 97.4 (03 Dec 2021 05:17), Max: 97.7 (02 Dec 2021 20:38)  HR: 89 (03 Dec 2021 05:17) (89 - 100)  BP: 110/70 (03 Dec 2021 05:17) (110/70 - 117/62)  BP(mean): --  RR: 17 (03 Dec 2021 05:17) (17 - 18)  SpO2: 97% (03 Dec 2021 05:17) (97% - 100%)    PHYSICAL EXAMINATION:  GENERAL: NAD, well built  HEAD:  Atraumatic, Normocephalic  EYES:  conjunctiva and sclera clear  NECK: Supple, No JVD, Normal thyroid  CHEST/LUNG: Clear to auscultation. Clear to percussion bilaterally; No rales, rhonchi, wheezing, or rubs  HEART: Regular rate and rhythm; No murmurs, rubs, or gallops  ABDOMEN: Soft, Nontender, Nondistended; Bowel sounds present  NERVOUS SYSTEM:  Alert & Oriented X3,    EXTREMITIES:  2+ Peripheral Pulses, No clubbing, cyanosis, or edema  SKIN: warm dry                          14.4   5.74  )-----------( 162      ( 03 Dec 2021 07:54 )             40.3     12-03    141  |  106  |  6<L>  ----------------------------<  114<H>  3.3<L>   |  28  |  0.70    Ca    8.4      03 Dec 2021 07:54  Phos  2.3     12-03  Mg     2.2     12-03    TPro  6.8  /  Alb  3.2<L>  /  TBili  1.2  /  DBili  x   /  AST  31  /  ALT  30  /  AlkPhos  35<L>  12-03    LIVER FUNCTIONS - ( 03 Dec 2021 07:54 )  Alb: 3.2 g/dL / Pro: 6.8 g/dL / ALK PHOS: 35 U/L / ALT: 30 U/L DA / AST: 31 U/L / GGT: x                   CAPILLARY BLOOD GLUCOSE      RADIOLOGY & ADDITIONAL TESTS:                   PGY-1 Progress Note discussed with attending    PAGER #: [[phone]] TILL 5:00 PM  PLEASE CONTACT ON CALL TEAM:  - On Call Team (Please refer to Buffy) FROM 5:00 PM - 8:30PM  - Nightfloat Team FROM 8:30 -7:30 AM      INTERVAL HPI/OVERNIGHT EVENTS:  No overnight events. Pt seen and examined today by bedside. Feels ok, denies HA, visual/auditory hallucinations, N/V. Poor oral intake. No other acute complaint. Diuresing normally, + BM.      REVIEW OF SYSTEMS:  CONSTITUTIONAL: No fever, weight loss, or fatigue  RESPIRATORY: No cough, wheezing, chills or hemoptysis; No shortness of breath  CARDIOVASCULAR: No chest pain, palpitations, dizziness, or leg swelling  GASTROINTESTINAL: No abdominal pain. No nausea, vomiting, or hematemesis; No diarrhea or constipation. No melena or hematochezia.  GENITOURINARY: No dysuria or hematuria, urinary frequency  NEUROLOGICAL: No headaches, memory loss, loss of strength, numbness, or tremors  SKIN: No itching, burning, rashes, or lesions     Vital Signs Last 24 Hrs  T(C): 36.3 (03 Dec 2021 05:17), Max: 36.5 (02 Dec 2021 20:38)  T(F): 97.4 (03 Dec 2021 05:17), Max: 97.7 (02 Dec 2021 20:38)  HR: 89 (03 Dec 2021 05:17) (89 - 100)  BP: 110/70 (03 Dec 2021 05:17) (110/70 - 117/62)  RR: 17 (03 Dec 2021 05:17) (17 - 18)  SpO2: 97% (03 Dec 2021 05:17) (97% - 100%)    PHYSICAL EXAMINATION:  GENERAL: NAD, well built  HEAD:  Atraumatic, Normocephalic  EYES:  conjunctiva and sclera clear  NECK: Supple, No JVD  CHEST/LUNG: Clear to auscultation. Clear to percussion bilaterally; No rales, rhonchi, wheezing, or rubs  HEART: Regular rate and rhythm; No murmurs, rubs, or gallops  ABDOMEN: Soft, Nontender, Nondistended; Bowel sounds present  NERVOUS SYSTEM:  Alert & Oriented X3,  mild tremor on his hands (improving)   EXTREMITIES:  2+ Peripheral Pulses, No clubbing, cyanosis, or edema  SKIN: warm dry                          14.4   5.74  )-----------( 162      ( 03 Dec 2021 07:54 )             40.3     12-03    141  |  106  |  6<L>  ----------------------------<  114<H>  3.3<L>   |  28  |  0.70    Ca    8.4      03 Dec 2021 07:54  Phos  2.3     12-03  Mg     2.2     12-03    TPro  6.8  /  Alb  3.2<L>  /  TBili  1.2  /  DBili  x   /  AST  31  /  ALT  30  /  AlkPhos  35<L>  12-03    LIVER FUNCTIONS - ( 03 Dec 2021 07:54 )  Alb: 3.2 g/dL / Pro: 6.8 g/dL / ALK PHOS: 35 U/L / ALT: 30 U/L DA / AST: 31 U/L / GGT: x                   CAPILLARY BLOOD GLUCOSE      RADIOLOGY & ADDITIONAL TESTS:

## 2021-12-03 NOTE — PROGRESS NOTE ADULT - ATTENDING COMMENTS
Patient seen and examined this morning with PGY1 and PGY3    Patient admitted with Alcohol withdrawal and Polysubstance abuse on [facility] protocol stable, comfortably resting, not agitated. Hx Major depression.  He was tearful during the visit when questioned about reason behind his addiction. He works as a .     He is clinically doing better. No abdominal pain, nausea or vomiting. denies fever, chills, SOB, palpitations, chest pain, constipation, dizziness    Vital Signs Last 24 Hrs  T(C): 36.8 (03 Dec 2021 13:26), Max: 36.8 (03 Dec 2021 13:26)  T(F): 98.3 (03 Dec 2021 13:26), Max: 98.3 (03 Dec 2021 13:26)  HR: 91 (03 Dec 2021 13:26) (89 - 91)  BP: 108/66 (03 Dec 2021 13:26) (108/66 - 117/62)  RR: 17 (03 Dec 2021 13:26) (17 - 18)  SpO2: 100% (03 Dec 2021 13:26) (97% - 100%)    P/E:  Middle aged male, comfortably sleeping but easily arousable;   Psych: AAO x3; depressed mood  Neuro: No gross focal deficits; Power and sensation intact  CVS: S1S2 present, regular, no edema  Resp: BLAE+, No wheeze or Rhonchi  GI: Soft, BS+, Non tender, non distended  Extr: No  calf tenderness B/L Lower extremities  Skin: Warm and moist without any rashes      Labs:                      14.4   5.74  )-----------( 162      ( 03 Dec 2021 07:54 )             40.3   12-03    141  |  106  |  6<L>  ----------------------------<  114<H>  3.3<L>   |  28  |  0.70    Ca    8.4      03 Dec 2021 07:54  Phos  2.3     12-03  Mg     2.2     12-03    TPro  6.8  /  Alb  3.2<L>  /  TBili  1.2  /  DBili  x   /  AST  31  /  ALT  30  /  AlkPhos  35<L>  12-03    US Abdomen Limited (12.02.21 @ 13:13)     FINDINGS:  Liver: Increased echogenicity. Probable focal fat sparing adjacent to the gallbladder.  Bile ducts: Normal caliber. Common bile duct measures 3 mm.  Gallbladder: Within normal limits.  Pancreas: Poorly visualized.  Right kidney: 12.4 cm. No hydronephrosis.  Ascites: None.  IVC: Visualized portions are within normal limits.    IMPRESSION: Hepatic steatosis.    D/D:  Alcohol withdrawal  Polysubstance abuse  Hx Depression with Anxiety    Plan:  CIWA protocol; IV Thiamine  Taper Ativan  PO Folate and Multivitamin  Resume Fluoxetine  Follow up RUQ Sono; likely Hepatic steatosis  Alcohol cessation counseling provided. Patient understands his dependance; has tried rehab in the past; He may be willing to reconsider Inpatient vs Outpatient; discussed with  Jessica   Patient does have a PCP and Psychiatrist outpatient; will hold off Psych consult at this time  D/C plan Monday once tapered off Ativan if remian stable      Rest as per PGY1 above  Discussed with PGY1 Dr. García and PGY2 Dr. Landry

## 2021-12-04 ENCOUNTER — TRANSCRIPTION ENCOUNTER (OUTPATIENT)
Age: 36
End: 2021-12-04

## 2021-12-04 PROCEDURE — 99232 SBSQ HOSP IP/OBS MODERATE 35: CPT

## 2021-12-04 RX ADMIN — Medication 105 MILLIGRAM(S): at 06:03

## 2021-12-04 RX ADMIN — Medication 50 MILLIGRAM(S): at 17:39

## 2021-12-04 RX ADMIN — PANTOPRAZOLE SODIUM 40 MILLIGRAM(S): 20 TABLET, DELAYED RELEASE ORAL at 06:03

## 2021-12-04 RX ADMIN — Medication 1 TABLET(S): at 12:36

## 2021-12-04 RX ADMIN — Medication 1 MILLIGRAM(S): at 12:36

## 2021-12-04 RX ADMIN — Medication 40 MILLIGRAM(S): at 12:35

## 2021-12-04 RX ADMIN — Medication 105 MILLIGRAM(S): at 15:00

## 2021-12-04 RX ADMIN — Medication 1 PACKET(S): at 06:03

## 2021-12-04 RX ADMIN — Medication 105 MILLIGRAM(S): at 22:00

## 2021-12-04 NOTE — PROGRESS NOTE ADULT - PROBLEM SELECTOR PLAN 4
pt on Fluoxetine at home  recent inhalation of Meth  he was monitored for serotonin syndrome  resumed fluoxetine home dose as it has been >48 hrs since methadone ingestion
pt on Fluoxetine at home  recent inhalation of Meth  monitor for serotonin syndrome  would hold Fluoxetine for 48 hrs until Meth clears
pt on Fluoxetine at home  recent inhalation of Meth  he was monitored for serotonin syndrome  resumed fluoxetine home dose as it has been >48 hrs since methadone ingestion

## 2021-12-04 NOTE — DISCHARGE NOTE PROVIDER - CARE PROVIDER_API CALL
Quang Sonya  INTERNAL MEDICINE  [address] [id number]  Phone: [phone]  Fax: [phone]  Established Patient  Follow Up Time: 1 week

## 2021-12-04 NOTE — PROGRESS NOTE ADULT - ATTENDING COMMENTS
Patient seen and examined this morning with PGY1 and PGY3    Patient admitted with Alcohol withdrawal and Polysubstance abuse on [facility] protocol stable, comfortably resting, not agitated. Hx Major depression.  He was tearful during the visit when questioned about reason behind his addiction. He works as a .     He is clinically doing better. No abdominal pain, nausea or vomiting. denies fever, chills, SOB, palpitations, chest pain, constipation, dizziness    Vital Signs Last 24 Hrs  T(C): 36.7 (04 Dec 2021 13:43), Max: 36.7 (03 Dec 2021 20:36)  T(F): 98 (04 Dec 2021 13:43), Max: 98 (03 Dec 2021 20:36)  HR: 80 (04 Dec 2021 13:43) (80 - 97)  BP: 103/60 (04 Dec 2021 13:43) (103/60 - 111/70)  RR: 17 (04 Dec 2021 13:43) (17 - 17)  SpO2: 100% (04 Dec 2021 13:43) (97% - 100%)    P/E:  Middle aged male, comfortably sleeping but easily arousable;   Psych: AAO x3; depressed mood  Neuro: No gross focal deficits; Power and sensation intact  CVS: S1S2 present, regular, no edema  Resp: BLAE+, No wheeze or Rhonchi  GI: Soft, BS+, Non tender, non distended  Extr: No  calf tenderness B/L Lower extremities  Skin: Warm and moist without any rashes      Labs:                                 14.4   5.74  )-----------( 162      ( 03 Dec 2021 07:54 )             40.3   12-03    141  |  106  |  6<L>  ----------------------------<  114<H>  3.3<L>   |  28  |  0.70    Ca    8.4      03 Dec 2021 07:54  Phos  2.3     12-03  Mg     2.2     12-03    TPro  6.8  /  Alb  3.2<L>  /  TBili  1.2  /  DBili  x   /  AST  31  /  ALT  30  /  AlkPhos  35<L>  12-03      US Abdomen Limited (12.02.21 @ 13:13)     FINDINGS:  Liver: Increased echogenicity. Probable focal fat sparing adjacent to the gallbladder.  Bile ducts: Normal caliber. Common bile duct measures 3 mm.  Gallbladder: Within normal limits.  Pancreas: Poorly visualized.  Right kidney: 12.4 cm. No hydronephrosis.  Ascites: None.  IVC: Visualized portions are within normal limits.    IMPRESSION: Hepatic steatosis.    D/D:  Alcohol withdrawal  Polysubstance abuse  Hx Depression with Anxiety    Plan:  CIWA protocol; IV Thiamine  Taper Ativan  PO Folate and Multivitamin  Resume Fluoxetine  Follow up RUQ Sono; likely Hepatic steatosis  Alcohol cessation counseling provided. Patient understands his dependance; has tried rehab in the past; He may be willing to reconsider Inpatient vs Outpatient; discussed with  Jessica   Patient does have a PCP and Psychiatrist outpatient; will hold off Psych consult at this time  D/C plan Monday once tapered off Ativan if remian stable      Rest as per PGY1 above  Discussed with PGY1 Dr. García and PGY2 Dr. Landry Patient seen and examined this morning with PGY1 and PGY3    Patient admitted with Alcohol withdrawal and Polysubstance abuse on [facility] protocol stable, comfortably resting, not agitated. Hx Major depression.  He was tearful during the visit when questioned about reason behind his addiction. He works as a .     He is clinically doing better. No abdominal pain, nausea or vomiting. denies fever, chills, SOB, palpitations, chest pain, constipation, dizziness    Vital Signs Last 24 Hrs  T(C): 36.7 (04 Dec 2021 13:43), Max: 36.7 (03 Dec 2021 20:36)  T(F): 98 (04 Dec 2021 13:43), Max: 98 (03 Dec 2021 20:36)  HR: 80 (04 Dec 2021 13:43) (80 - 97)  BP: 103/60 (04 Dec 2021 13:43) (103/60 - 111/70)  RR: 17 (04 Dec 2021 13:43) (17 - 17)  SpO2: 100% (04 Dec 2021 13:43) (97% - 100%)    P/E:  Middle aged male, comfortably sleeping but easily arousable;   Psych: AAO x3; depressed mood  Neuro: No gross focal deficits; Power and sensation intact  CVS: S1S2 present, regular, no edema  Resp: BLAE+, No wheeze or Rhonchi  GI: Soft, BS+, Non tender, non distended  Extr: No  calf tenderness B/L Lower extremities  Skin: Warm and moist without any rashes      Labs:                                 14.4   5.74  )-----------( 162      ( 03 Dec 2021 07:54 )             40.3   12-03    141  |  106  |  6<L>  ----------------------------<  114<H>  3.3<L>   |  28  |  0.70    Ca    8.4      03 Dec 2021 07:54  Phos  2.3     12-03  Mg     2.2     12-03    TPro  6.8  /  Alb  3.2<L>  /  TBili  1.2  /  DBili  x   /  AST  31  /  ALT  30  /  AlkPhos  35<L>  12-03      US Abdomen Limited (12.02.21 @ 13:13)     FINDINGS:  Liver: Increased echogenicity. Probable focal fat sparing adjacent to the gallbladder.  Bile ducts: Normal caliber. Common bile duct measures 3 mm.  Gallbladder: Within normal limits.  Pancreas: Poorly visualized.  Right kidney: 12.4 cm. No hydronephrosis.  Ascites: None.  IVC: Visualized portions are within normal limits.    IMPRESSION: Hepatic steatosis.    D/D:  Alcohol withdrawal  Polysubstance abuse  Hx Depression with Anxiety    Plan:  CIWA protocol; IV Thiamine  Taper Librium as per protocol; Ativan PRN  PO Folate and Multivitamin  Resume Fluoxetine  RUQ Sono;  Hepatic steatosis  Alcohol cessation counseling provided. Patient understands his dependance; has tried rehab in the past; He may be willing to reconsider Inpatient vs Outpatient; SW follow up noted; Patient wants to f/u [facility]   Patient does have a PCP and Psychiatrist outpatient; will hold off Psych consult at this time  D/C plan Sunday into  Monday once tapered off Librium if remain stable    Rest as per PGY1 above  Discussed with PGY1 Dr. García and PGY2 Dr. Landry  Discussed with Patient; agree with plan

## 2021-12-04 NOTE — DISCHARGE NOTE PROVIDER - HOSPITAL COURSE
36 y/o F with hx of general anxiety and depression who presented to the hospital with complains of Nausea, vomiting, palpitations and general anxiety. Patient endorses that he has been drinking heavily because of the pandemic, and in general has been trying to cut back, but for the last couple of days he has been drinking around 1L of vodka per day, last drink 12/1 during the day, and around 11am on 12/1 he also smoked meth which he had never done before. Because of this he experienced generalized anxiety, palpitations, headache, nausea and vomiting. He also states that he feels dehydrated. Denies any other complaints. In ED: BAL 47, Utox + for amphetamines and thc, was given librium and ativan and 1L bolus.   Pt is a 36 y/o M with hx of depression who presented to the hospital with complains of Nausea, vomiting, palpitations and general anxiety. Patient endorses that he has been drinking heavily because of the pandemic, and in general has been trying to cut back, but for the last couple of days prior to admission he has been drinking around 1L of vodka per day, last drink 12/1 during the day, he also smoked meth which he had never done before. Because of this he experienced generalized anxiety, palpitations, headache, nausea and vomiting In Ed: BAL 47, Utox + for amphetamines and thc, was given librium and ativan and 1L bolus. Pt was admitted for alcohol withdrawal, CIWA on admission of 6,  was started on CIWA protocol with librium  and was supplemented with thiamine and folic acid.     During hospital stay pt responded well to tapering doses of librium, did not require PRN ativan, CIWA score improved, on the day of DC CIWA score of 0. Given that prior to coming to the ED he had used amphetamines, fluoxetine medication was initially held. After 48 hrs medication was resumed. SW was consulted for placement in an drug abuse rehabilitation program, as per pt wishes he wants to go to [facility]. Pt was noted to have mildly elevated total bilirubin. he underwent RUQ US which showed hepatic steatosis, most likely 2/2 to chronic alcohol use.  Alcohol cessation education was provided.     Given improvement of symptoms he is to be discharged home today. He needs to follow up with PCP  and psychiatrist OP and should also start rehabilitation program for substance abuse. Pt verbalized understanding and in agreement.

## 2021-12-04 NOTE — DISCHARGE NOTE PROVIDER - NSDCFUADDINST_GEN_ALL_CORE_FT
ALCOHOL CESSATION COUNSELED TO PREVENT FUTURE COMPLICATIONS AND INJURY  FOLLOW UP WITH PSYCHIATRIST AND ADJUST ANTIDEPRESSANT MEDICATION  FOLLOW UP WITH PCP FOR CONTINUED MEDICAL CARE

## 2021-12-04 NOTE — DISCHARGE NOTE PROVIDER - NSDCPNSUBOBJ_GEN_ALL_CORE
Patient seen and examined this morning and later this afternoon prior to discharge    Patient admitted with Alcohol withdrawal and Polysubstance abuse on [facility] protocol stable, comfortably resting, not agitated. Hx Major depression.  He was tearful during the visit when questioned about reason behind his addiction. He works as a . treated with fluids, MVI, Folate and thiamine     He is clinically doing much better. ate well; No DTs noted; tapered off Librium  No abdominal pain, nausea or vomiting. denies fever, chills, SOB, palpitations, chest pain, constipation, dizziness    Vital Signs Last 24 Hrs  T(C): 36.1 (05 Dec 2021 17:01), Max: 36.7 (05 Dec 2021 13:43)  T(F): 97 (05 Dec 2021 17:01), Max: 98 (05 Dec 2021 13:43)  HR: 86 (05 Dec 2021 20:27) (68 - 87)  BP: 108/65 (05 Dec 2021 17:01) (99/65 - 108/65)  RR: 18 (05 Dec 2021 17:01) (17 - 18)  SpO2: 99% (05 Dec 2021 17:01) (98% - 99%)    P/E:  Middle aged male, comfortable, NAD, alert and awake  Psych: AAO x3; depressed mood  Neuro: No gross focal deficits; Power and sensation intact  CVS: S1S2 present, regular, no edema  Resp: BLAE+, No wheeze or Rhonchi  GI: Soft, BS+, Non tender, non distended  Extr: No  calf tenderness B/L Lower extremities  Skin: Warm and moist without any rashes      Labs:           12-05    140  |  105  |  12  ----------------------------<  96  3.3<L>   |  29  |  0.89    Ca    9.3      05 Dec 2021 08:15  Phos  3.7     12-05  Mg     2.2     12-05    TPro  7.5  /  Alb  3.5  /  TBili  1.1  /  DBili  x   /  AST  34  /  ALT  41  /  AlkPhos  41  12-05        US Abdomen Limited (12.02.21 @ 13:13)     FINDINGS:  Liver: Increased echogenicity. Probable focal fat sparing adjacent to the gallbladder.  Bile ducts: Normal caliber. Common bile duct measures 3 mm.  Gallbladder: Within normal limits.  Pancreas: Poorly visualized.  Right kidney: 12.4 cm. No hydronephrosis.  Ascites: None.  IVC: Visualized portions are within normal limits.    IMPRESSION: Hepatic steatosis.    D/D:  Alcohol withdrawal  Polysubstance abuse  Hx Depression with Anxiety    Plan:  Continue PO Folate, Thiamine and Multivitamin d/w patient  Continue Fluoxetine; he has at home; advised not to use with Amphetamines  RUQ Sono;  Hepatic steatosis  Alcohol cessation counseling again provided. Patient understands his dependance;  Patient wants to f/u [facility]hab [facility]   Patient does have a PCP and Psychiatrist outpatient;   D/C Home    Discussed with Patient; agree with plan.   d/w RN taxi voucher arranged    See discharge instructions reviewed and updated    (Spend 40 mins with >50% spend counseling and coordination of care and d/w Patient and providers)

## 2021-12-04 NOTE — DISCHARGE NOTE PROVIDER - NSDCMRMEDTOKEN_GEN_ALL_CORE_FT
FLUoxetine 40 mg oral capsule: 1 cap(s) orally once a day   FLUoxetine 40 mg oral capsule: 1 cap(s) orally once a day  folic acid 1 mg oral tablet: 1 tab(s) orally once a day  Multiple Vitamins with Minerals oral tablet: 1 tab(s) orally once a day   FLUoxetine 40 mg oral capsule: 1 cap(s) orally once a day  folic acid 1 mg oral tablet: 1 tab(s) orally once a day  Multiple Vitamins with Minerals oral tablet: 1 tab(s) orally once a day  thiamine 100 mg oral tablet: 1 tab(s) orally once a day

## 2021-12-04 NOTE — PROGRESS NOTE ADULT - PROBLEM SELECTOR PLAN 1
Pt with Hx of daily alcohol use  last drink 1 day PTA  BAL 47 on admission  CIWA: 6, (anxious, headache, N/v) on admission  S/p Librium and ativan in ED  PAWS score of 8 (12/2/2210  CIWA today of 0; still not requiring PRN ativan during the past 24 hrs   c/w ciwa protocol with librium taper (currently at 50 mg PO Q 12 hrs) and ativan prn  c/w thiamine, folic acid, mv  SW consulted for alcohol/drug use rehab, pt wants to go to [facility] rehabilitation facility ([facility])  If pt continues improving, most likely DC tomorrow
Pt with Hx of daily alcohol use  last drink 1 day PTA  BAL 47  CIWA: 6, (anxious, headache, N/v)  S/p Librium and ativan in ED  CIWA today still at 6  PAWS score of 8  Has not required PRN ativan   c/w ciwa protocol with librium taper and ativan prn  c/w thiamine, folic acid, mv  SW consulted for alcohol/drug use rehab
Pt with Hx of daily alcohol use  last drink 1 day PTA  BAL 47  CIWA: 6, (anxious, headache, N/v) on admission  S/p Librium and ativan in ED  PAWS score of 8 (12/2/2210  CIWA today of 1   c/w ciwa protocol with librium taper and ativan prn  c/w thiamine, folic acid, mv  SW consulted for alcohol/drug use rehab

## 2021-12-04 NOTE — PROGRESS NOTE ADULT - PROBLEM SELECTOR PLAN 3
Noted today with T. Bili 1.3  No jaundice on PE  Ordered RUQ US which showed hepatic steatosis
Noted today with T. Bili 1.3  No jaundice on PE  Ordered RUQ US
Noted today with T. Bili 1.3  No jaundice on PE  Ordered RUQ US which showed hepatic steatosis

## 2021-12-04 NOTE — PROGRESS NOTE ADULT - ASSESSMENT
Pt is a 36 y/o F with hx of general anxiety and depression who presented to the hospital with complains of Nausea, vomiting, palpitations and general anxiety. Admitted for alcohol withdrawal and Meth intoxication.

## 2021-12-04 NOTE — PROGRESS NOTE ADULT - PROBLEM SELECTOR PLAN 2
Pt endorses smoking meth around 11am on 12/1  developed generalized anxiety, palpitations, nausea, vomiting, headache after  also uses Fluoxetine at home  monitor for serotonin syndrome  hold Fluoxetine for 48 hrs until Meth clears
Pt endorses smoking meth around 11am on 12/1  developed generalized anxiety, palpitations, nausea, vomiting, headache after  also uses Fluoxetine at home  hold Fluoxetine for 48 hrs until Meth cleared up  resumed fluoxetine yesterday
Pt endorses smoking meth around 11am on 12/1  developed generalized anxiety, palpitations, nausea, vomiting, headache after  also uses Fluoxetine at home  hold Fluoxetine for 48 hrs until Meth cleared up

## 2021-12-04 NOTE — PROGRESS NOTE ADULT - PROBLEM SELECTOR PLAN 5
encourage ambulation for dvt ppx  protonix for gi ppx

## 2021-12-04 NOTE — PROGRESS NOTE ADULT - SUBJECTIVE AND OBJECTIVE BOX
PGY-1 Progress Note discussed with attending    PAGER #: [[phone]] TILL 5:00 PM  PLEASE CONTACT ON CALL TEAM:  - On Call Team (Please refer to Buffy) FROM 5:00 PM - 8:30PM  - Nightfloat Team FROM 8:30 -7:30 AM    CHIEF COMPLAINT & BRIEF HOSPITAL COURSE:    INTERVAL HPI/OVERNIGHT EVENTS:       REVIEW OF SYSTEMS:  CONSTITUTIONAL: No fever, weight loss, or fatigue  RESPIRATORY: No cough, wheezing, chills or hemoptysis; No shortness of breath  CARDIOVASCULAR: No chest pain, palpitations, dizziness, or leg swelling  GASTROINTESTINAL: No abdominal pain. No nausea, vomiting, or hematemesis; No diarrhea or constipation. No melena or hematochezia.  GENITOURINARY: No dysuria or hematuria, urinary frequency  NEUROLOGICAL: No headaches, memory loss, loss of strength, numbness, or tremors  SKIN: No itching, burning, rashes, or lesions     Vital Signs Last 24 Hrs  T(C): 36.6 (04 Dec 2021 05:28), Max: 36.8 (03 Dec 2021 13:26)  T(F): 97.8 (04 Dec 2021 05:28), Max: 98.3 (03 Dec 2021 13:26)  HR: 89 (04 Dec 2021 05:28) (89 - 97)  BP: 107/73 (04 Dec 2021 05:28) (107/73 - 111/70)  BP(mean): --  RR: 17 (04 Dec 2021 05:28) (17 - 17)  SpO2: 97% (04 Dec 2021 05:28) (97% - 100%)    PHYSICAL EXAMINATION:  GENERAL: NAD, well built  HEAD:  Atraumatic, Normocephalic  EYES:  conjunctiva and sclera clear  NECK: Supple, No JVD, Normal thyroid  CHEST/LUNG: Clear to auscultation. Clear to percussion bilaterally; No rales, rhonchi, wheezing, or rubs  HEART: Regular rate and rhythm; No murmurs, rubs, or gallops  ABDOMEN: Soft, Nontender, Nondistended; Bowel sounds present  NERVOUS SYSTEM:  Alert & Oriented X3,    EXTREMITIES:  2+ Peripheral Pulses, No clubbing, cyanosis, or edema  SKIN: warm dry                          14.4   5.74  )-----------( 162      ( 03 Dec 2021 07:54 )             40.3     12-03    141  |  106  |  6<L>  ----------------------------<  114<H>  3.3<L>   |  28  |  0.70    Ca    8.4      03 Dec 2021 07:54  Phos  2.3     12-03  Mg     2.2     12-03    TPro  6.8  /  Alb  3.2<L>  /  TBili  1.2  /  DBili  x   /  AST  31  /  ALT  30  /  AlkPhos  35<L>  12-03    LIVER FUNCTIONS - ( 03 Dec 2021 07:54 )  Alb: 3.2 g/dL / Pro: 6.8 g/dL / ALK PHOS: 35 U/L / ALT: 30 U/L DA / AST: 31 U/L / GGT: x                   CAPILLARY BLOOD GLUCOSE      RADIOLOGY & ADDITIONAL TESTS:                   PGY-1 Progress Note discussed with attending    PAGER #: [[phone]] TILL 5:00 PM  PLEASE CONTACT ON CALL TEAM:  - On Call Team (Please refer to Buffy) FROM 5:00 PM - 8:30PM  - Nightfloat Team FROM 8:30 -7:30 AM      INTERVAL HPI/OVERNIGHT EVENTS:  Pt seen and examined by bedside, feels ok. Endorses mild nausea, no vomiting. Has not had breakfast. Denies HA, anxiety, hallucinations. No other acute complaint       REVIEW OF SYSTEMS:  CONSTITUTIONAL: No fever, weight loss, or fatigue  RESPIRATORY: No cough, wheezing, chills or hemoptysis; No shortness of breath  CARDIOVASCULAR: No chest pain, palpitations, dizziness, or leg swelling  GASTROINTESTINAL: No abdominal pain.  + nausea, no vomiting, or hematemesis; No diarrhea or constipation. No melena or hematochezia.  GENITOURINARY: No dysuria or hematuria, urinary frequency  NEUROLOGICAL: No headaches, memory loss, loss of strength, numbness, no tremors  SKIN: No itching, burning, rashes, or lesions     Vital Signs Last 24 Hrs  T(C): 36.6 (04 Dec 2021 05:28), Max: 36.8 (03 Dec 2021 13:26)  T(F): 97.8 (04 Dec 2021 05:28), Max: 98.3 (03 Dec 2021 13:26)  HR: 89 (04 Dec 2021 05:28) (89 - 97)  BP: 107/73 (04 Dec 2021 05:28) (107/73 - 111/70)  BP(mean): --  RR: 17 (04 Dec 2021 05:28) (17 - 17)  SpO2: 97% (04 Dec 2021 05:28) (97% - 100%)      PHYSICAL EXAMINATION:  GENERAL: NAD, well built  HEAD:  Atraumatic, Normocephalic  EYES:  conjunctiva and sclera clear  NECK: Supple, No JVD  CHEST/LUNG: Clear to auscultation. Clear to percussion bilaterally; No rales, rhonchi, wheezing, or rubs  HEART: Regular rate and rhythm; No murmurs, rubs, or gallops  ABDOMEN: Soft, Nontender, Nondistended; Bowel sounds present  NERVOUS SYSTEM:  Alert & Oriented X3,  no tremor on his hands   EXTREMITIES:  2+ Peripheral Pulses, No clubbing, cyanosis, or edema  SKIN: warm dry                        14.4   5.74  )-----------( 162      ( 03 Dec 2021 07:54 )             40.3     12-03    141  |  106  |  6<L>  ----------------------------<  114<H>  3.3<L>   |  28  |  0.70    Ca    8.4      03 Dec 2021 07:54  Phos  2.3     12-03  Mg     2.2     12-03    TPro  6.8  /  Alb  3.2<L>  /  TBili  1.2  /  DBili  x   /  AST  31  /  ALT  30  /  AlkPhos  35<L>  12-03    LIVER FUNCTIONS - ( 03 Dec 2021 07:54 )  Alb: 3.2 g/dL / Pro: 6.8 g/dL / ALK PHOS: 35 U/L / ALT: 30 U/L DA / AST: 31 U/L / GGT: x                   CAPILLARY BLOOD GLUCOSE      RADIOLOGY & ADDITIONAL TESTS:

## 2021-12-04 NOTE — DISCHARGE NOTE PROVIDER - NSDCCPCAREPLAN_GEN_ALL_CORE_FT
PRINCIPAL DISCHARGE DIAGNOSIS  Diagnosis: Alcohol abuse  Assessment and Plan of Treatment:       SECONDARY DISCHARGE DIAGNOSES  Diagnosis: Amphetamine user  Assessment and Plan of Treatment:     Diagnosis: Major depression  Assessment and Plan of Treatment:     Diagnosis: High serum bilirubin level  Assessment and Plan of Treatment:      PRINCIPAL DISCHARGE DIAGNOSIS  Diagnosis: Alcohol abuse  Assessment and Plan of Treatment: you have history of alcohol abuse. When you came to the hospital you had been drinking heavily and combined alcohol with other substances. As a result, you were experiencing anxiety and palpitations.  To manage your symptoms you were started on a medication called librium to treat alcohol withdrawal. You have responded well to the treatment and are no longer experiencing symptoms of alcohol withdrawal.  You were evaluated by social work for placement in a rehabilitation program. However, you have decided to go to a rehabilitation program as an outpatient.   Upon discharge it is very important that you attend a rehabilitation program for susbtance abuse. Please continue to take thiamine and folic acid as directed.      SECONDARY DISCHARGE DIAGNOSES  Diagnosis: Amphetamine user  Assessment and Plan of Treatment: You have history of substance abuse.  You were evaluated by social work for placement in a rehabilitation program. However, you have decided to go to a rehabilitation program as an outpatient.   Upon discharge it is very important that you attend a rehabilitation program for susbtance abuse.    Diagnosis: Major depression  Assessment and Plan of Treatment: You have history of major depression managed with fluoxetine at home. We could not reestart your medication on admission, instead had to wait for 48 hrs to resume it given that you had been using amphetamines.   You need to follow up with your outpatient psychiatrist  to readjust your medicaton as needed and monitor your symptoms.    Diagnosis: Steatosis, liver  Assessment and Plan of Treatment: You were found to have elevated bilirrubin levels. Underwent imaging of your liver which showed signs of accumulation of fat contents in your liver, which can be seen in the setting of chronic alcohol use. Please enroll in a program for alcohol and substance abuse to prevent further damage to your liver and follow up with your PCP     PRINCIPAL DISCHARGE DIAGNOSIS  Diagnosis: Alcohol withdrawal syndrome  Assessment and Plan of Treatment: you have history of alcohol abuse. When you came to the hospital you had been drinking heavily and combined alcohol with other substances. As a result, you were experiencing anxiety and palpitations.  To manage your symptoms you were started on a medication called librium to treat alcohol withdrawal. You have responded well to the treatment and are no longer experiencing symptoms of alcohol withdrawal.  You were evaluated by social work for placement in a rehabilitation program. However, you have decided to go to a rehabilitation program as an outpatient.   Upon discharge it is very important that you attend a rehabilitation program for susbtance abuse. Please continue to take thiamine and folic acid as directed.      SECONDARY DISCHARGE DIAGNOSES  Diagnosis: Alcohol abuse  Assessment and Plan of Treatment: you have history of alcohol abuse. When you came to the hospital you had been drinking heavily and combined alcohol with other substances. As a result, you were experiencing anxiety and palpitations.  To manage your symptoms you were started on a medication called librium to treat alcohol withdrawal. You have responded well to the treatment and are no longer experiencing symptoms of alcohol withdrawal.  You were evaluated by social work for placement in a rehabilitation program. However, you have decided to go to a rehabilitation program as an outpatient.   Upon discharge it is very important that you attend a rehabilitation program for susbtance abuse. Please continue to take thiamine and folic acid as directed.    Diagnosis: Amphetamine user  Assessment and Plan of Treatment: You have history of substance abuse.  You were evaluated by social work for placement in a rehabilitation program. However, you have decided to go to a rehabilitation program as an outpatient.   Upon discharge it is very important that you attend a rehabilitation program for susbtance abuse.    Diagnosis: Major depression  Assessment and Plan of Treatment: You have history of major depression managed with fluoxetine at home. We could not reestart your medication on admission, instead had to wait for 48 hrs to resume it given that you had been using amphetamines.   You need to follow up with your outpatient psychiatrist  to readjust your medicaton as needed and monitor your symptoms.    Diagnosis: Steatosis, liver  Assessment and Plan of Treatment: You were found to have elevated bilirrubin levels. Underwent imaging of your liver which showed signs of accumulation of fat contents in your liver, which can be seen in the setting of chronic alcohol use. Please enroll in a program for alcohol and substance abuse to prevent further damage to your liver and follow up with your PCP     PRINCIPAL DISCHARGE DIAGNOSIS  Diagnosis: Alcohol withdrawal syndrome  Assessment and Plan of Treatment: you have history of alcohol abuse. When you came to the hospital you had been drinking heavily and combined alcohol with other substances. As a result, you were experiencing anxiety and palpitations.  To manage your symptoms you were started on a medication called librium to treat alcohol withdrawal. You have responded well to the treatment and are no longer experiencing symptoms of alcohol withdrawal.  You were evaluated by social work for placement in a rehabilitation program. However, you have decided to go to a rehabilitation program as an outpatient.   Upon discharge it is very important that you attend a rehabilitation program for susbtance abuse. Please continue to take thiamine and folic acid as directed.      SECONDARY DISCHARGE DIAGNOSES  Diagnosis: Alcohol abuse  Assessment and Plan of Treatment: you have history of alcohol abuse. When you came to the hospital you had been drinking heavily and combined alcohol with other substances. As a result, you were experiencing anxiety and palpitations.  To manage your symptoms you were started on a medication called librium to treat alcohol withdrawal. You have responded well to the treatment and are no longer experiencing symptoms of alcohol withdrawal.  You were evaluated by social work for placement in a rehabilitation program. However, you have decided to go to a rehabilitation program as an outpatient.   Upon discharge it is very important that you attend a rehabilitation program for susbtance abuse. Please continue to take thiamine and folic acid as directed.    Diagnosis: Amphetamine user  Assessment and Plan of Treatment: You have history of substance abuse.  You were evaluated by social work for placement in a rehabilitation program. However, you have decided to go to a rehabilitation program as an outpatient.   You have been counseled at length to not use other substances especially in conjunction with Alcohol.    Upon discharge it is very important that you attend a rehabilitation program for susbtance abuse. It could be as outpatient as you wish.    Diagnosis: Major depression  Assessment and Plan of Treatment: You have history of major depression managed with fluoxetine at home. We could not reestart your medication on admission, instead had to wait for 48 hrs to resume it given that you had been using amphetamines. You was placed back on it with no adverse reaction. PLEASE AVOID USING AMPHETAMINES IN THE SETTING OF TAKING FLUOXETINE  You need to follow up with your outpatient psychiatrist  to adjust your medicaton as needed and monitor your symptoms.    Diagnosis: Steatosis, liver  Assessment and Plan of Treatment: You were found to have elevated bilirrubin levels. Underwent imaging of your liver which showed signs of accumulation of fat contents in your liver, which can be seen in the setting of chronic alcohol use. Please enroll in a program for alcohol and substance abuse to prevent further damage to your liver and follow up with your PCP. Elevated levels were likely due to alcohol use. it has normalized    Diagnosis: Electrolyte disturbance  Assessment and Plan of Treatment: You was noted to have low potassium during hospital stay initially due to poor oral intake which has normalized with adequate foood intake and medication replacement.

## 2021-12-05 ENCOUNTER — TRANSCRIPTION ENCOUNTER (OUTPATIENT)
Age: 36
End: 2021-12-05

## 2021-12-05 VITALS
TEMPERATURE: 98 F | HEART RATE: 86 BPM | DIASTOLIC BLOOD PRESSURE: 67 MMHG | RESPIRATION RATE: 18 BRPM | OXYGEN SATURATION: 100 % | SYSTOLIC BLOOD PRESSURE: 113 MMHG

## 2021-12-05 LAB
ALBUMIN SERPL ELPH-MCNC: 3.5 G/DL — SIGNIFICANT CHANGE UP (ref 3.5–5)
ALP SERPL-CCNC: 41 U/L — SIGNIFICANT CHANGE UP (ref 40–120)
ALT FLD-CCNC: 41 U/L DA — SIGNIFICANT CHANGE UP (ref 10–60)
ANION GAP SERPL CALC-SCNC: 6 MMOL/L — SIGNIFICANT CHANGE UP (ref 5–17)
AST SERPL-CCNC: 34 U/L — SIGNIFICANT CHANGE UP (ref 10–40)
BILIRUB SERPL-MCNC: 1.1 MG/DL — SIGNIFICANT CHANGE UP (ref 0.2–1.2)
BUN SERPL-MCNC: 12 MG/DL — SIGNIFICANT CHANGE UP (ref 7–18)
CALCIUM SERPL-MCNC: 9.3 MG/DL — SIGNIFICANT CHANGE UP (ref 8.4–10.5)
CHLORIDE SERPL-SCNC: 105 MMOL/L — SIGNIFICANT CHANGE UP (ref 96–108)
CO2 SERPL-SCNC: 29 MMOL/L — SIGNIFICANT CHANGE UP (ref 22–31)
CREAT SERPL-MCNC: 0.89 MG/DL — SIGNIFICANT CHANGE UP (ref 0.5–1.3)
GLUCOSE SERPL-MCNC: 96 MG/DL — SIGNIFICANT CHANGE UP (ref 70–99)
INR BLD: 1.07 RATIO — SIGNIFICANT CHANGE UP (ref 0.88–1.16)
MAGNESIUM SERPL-MCNC: 2.2 MG/DL — SIGNIFICANT CHANGE UP (ref 1.6–2.6)
MELD SCORE WITH DIALYSIS: 21 POINTS — SIGNIFICANT CHANGE UP
MELD SCORE WITHOUT DIALYSIS: 8 POINTS — SIGNIFICANT CHANGE UP
PHOSPHATE SERPL-MCNC: 3.7 MG/DL — SIGNIFICANT CHANGE UP (ref 2.5–4.5)
POTASSIUM SERPL-MCNC: 3.3 MMOL/L — LOW (ref 3.5–5.3)
POTASSIUM SERPL-SCNC: 3.3 MMOL/L — LOW (ref 3.5–5.3)
PROT SERPL-MCNC: 7.5 G/DL — SIGNIFICANT CHANGE UP (ref 6–8.3)
PROTHROM AB SERPL-ACNC: 12.7 SEC — SIGNIFICANT CHANGE UP (ref 10.6–13.6)
SODIUM SERPL-SCNC: 140 MMOL/L — SIGNIFICANT CHANGE UP (ref 135–145)

## 2021-12-05 PROCEDURE — 84100 ASSAY OF PHOSPHORUS: CPT

## 2021-12-05 PROCEDURE — 99239 HOSP IP/OBS DSCHRG MGMT >30: CPT | Mod: GC

## 2021-12-05 PROCEDURE — 80307 DRUG TEST PRSMV CHEM ANLYZR: CPT

## 2021-12-05 PROCEDURE — 87635 SARS-COV-2 COVID-19 AMP PRB: CPT

## 2021-12-05 PROCEDURE — 80074 ACUTE HEPATITIS PANEL: CPT

## 2021-12-05 PROCEDURE — 93005 ELECTROCARDIOGRAM TRACING: CPT

## 2021-12-05 PROCEDURE — 85025 COMPLETE CBC W/AUTO DIFF WBC: CPT

## 2021-12-05 PROCEDURE — 80053 COMPREHEN METABOLIC PANEL: CPT

## 2021-12-05 PROCEDURE — 96374 THER/PROPH/DIAG INJ IV PUSH: CPT

## 2021-12-05 PROCEDURE — 99285 EMERGENCY DEPT VISIT HI MDM: CPT

## 2021-12-05 PROCEDURE — 96361 HYDRATE IV INFUSION ADD-ON: CPT

## 2021-12-05 PROCEDURE — 36415 COLL VENOUS BLD VENIPUNCTURE: CPT

## 2021-12-05 PROCEDURE — 83735 ASSAY OF MAGNESIUM: CPT

## 2021-12-05 PROCEDURE — 82977 ASSAY OF GGT: CPT

## 2021-12-05 PROCEDURE — 82248 BILIRUBIN DIRECT: CPT

## 2021-12-05 PROCEDURE — 76705 ECHO EXAM OF ABDOMEN: CPT

## 2021-12-05 PROCEDURE — 82247 BILIRUBIN TOTAL: CPT

## 2021-12-05 RX ORDER — FOLIC ACID 0.8 MG
1 TABLET ORAL
Qty: 30 | Refills: 0
Start: 2021-12-05 | End: 2022-01-03

## 2021-12-05 RX ORDER — MULTIVIT-MIN/FERROUS GLUCONATE 9 MG/15 ML
1 LIQUID (ML) ORAL
Qty: 30 | Refills: 0
Start: 2021-12-05 | End: 2022-01-03

## 2021-12-05 RX ORDER — THIAMINE MONONITRATE (VIT B1) 100 MG
1 TABLET ORAL
Qty: 30 | Refills: 0
Start: 2021-12-05 | End: 2022-01-03

## 2021-12-05 RX ADMIN — Medication 1 MILLIGRAM(S): at 12:52

## 2021-12-05 RX ADMIN — PANTOPRAZOLE SODIUM 40 MILLIGRAM(S): 20 TABLET, DELAYED RELEASE ORAL at 05:25

## 2021-12-05 RX ADMIN — Medication 50 MILLIGRAM(S): at 05:25

## 2021-12-05 RX ADMIN — Medication 40 MILLIGRAM(S): at 12:52

## 2021-12-05 RX ADMIN — Medication 1 TABLET(S): at 12:52

## 2021-12-05 NOTE — DISCHARGE NOTE NURSING/CASE MANAGEMENT/SOCIAL WORK - NSDCPEFALRISK_GEN_ALL_CORE
For information on Fall & Injury Prevention, visit: [email or web address] OR  [email or web address] OR  https://www.cdc.gov/steadi/patient.html

## 2021-12-05 NOTE — DISCHARGE NOTE NURSING/CASE MANAGEMENT/SOCIAL WORK - PATIENT PORTAL LINK FT
You can access the FollowMyHealth Patient Portal offered by [facility] by registering at the following website: [email or web address]. By joining [employer]’s FollowMyHealth portal, you will also be able to view your health information using other applications (apps) compatible with our system.

## 2021-12-13 ENCOUNTER — EMERGENCY (EMERGENCY)
Facility: HOSPITAL | Age: 36
LOS: 1 days | Discharge: HOME | End: 2021-12-13
Attending: STUDENT IN AN ORGANIZED HEALTH CARE EDUCATION/TRAINING PROGRAM
Payer: MEDICAID

## 2021-12-13 VITALS
WEIGHT: 173.06 LBS | OXYGEN SATURATION: 98 % | HEART RATE: 125 BPM | HEIGHT: 70 IN | SYSTOLIC BLOOD PRESSURE: 120 MMHG | DIASTOLIC BLOOD PRESSURE: 75 MMHG | TEMPERATURE: 98 F | RESPIRATION RATE: 18 BRPM

## 2021-12-13 VITALS
RESPIRATION RATE: 18 BRPM | SYSTOLIC BLOOD PRESSURE: 118 MMHG | HEART RATE: 98 BPM | DIASTOLIC BLOOD PRESSURE: 71 MMHG | OXYGEN SATURATION: 98 % | TEMPERATURE: 98 F

## 2021-12-13 PROCEDURE — 71046 X-RAY EXAM CHEST 2 VIEWS: CPT

## 2021-12-13 PROCEDURE — 99285 EMERGENCY DEPT VISIT HI MDM: CPT

## 2021-12-13 PROCEDURE — 93010 ELECTROCARDIOGRAM REPORT: CPT

## 2021-12-13 PROCEDURE — 71046 X-RAY EXAM CHEST 2 VIEWS: CPT | Mod: 26

## 2021-12-13 PROCEDURE — 87635 SARS-COV-2 COVID-19 AMP PRB: CPT

## 2021-12-13 PROCEDURE — 83735 ASSAY OF MAGNESIUM: CPT

## 2021-12-13 PROCEDURE — 93005 ELECTROCARDIOGRAM TRACING: CPT

## 2021-12-13 PROCEDURE — 36415 COLL VENOUS BLD VENIPUNCTURE: CPT

## 2021-12-13 PROCEDURE — 80053 COMPREHEN METABOLIC PANEL: CPT

## 2021-12-13 PROCEDURE — 84484 ASSAY OF TROPONIN QUANT: CPT

## 2021-12-13 PROCEDURE — 85025 COMPLETE CBC W/AUTO DIFF WBC: CPT

## 2021-12-13 PROCEDURE — 99284 EMERGENCY DEPT VISIT MOD MDM: CPT | Mod: 25

## 2021-12-13 PROCEDURE — 84443 ASSAY THYROID STIM HORMONE: CPT

## 2021-12-13 PROCEDURE — 83690 ASSAY OF LIPASE: CPT

## 2021-12-13 RX ORDER — SODIUM CHLORIDE 9 MG/ML
1000 INJECTION INTRAMUSCULAR; INTRAVENOUS; SUBCUTANEOUS ONCE
Refills: 0 | Status: COMPLETED | OUTPATIENT
Start: 2021-12-13 | End: 2021-12-13

## 2021-12-13 RX ADMIN — SODIUM CHLORIDE 1000 MILLILITER(S): 9 INJECTION INTRAMUSCULAR; INTRAVENOUS; SUBCUTANEOUS at 21:30

## 2021-12-13 RX ADMIN — Medication 50 MILLIGRAM(S): at 22:44

## 2021-12-13 NOTE — ED ADULT TRIAGE NOTE - CHIEF COMPLAINT QUOTE
states having withdrawal has been drinking vodka for a week about one bottle per day  and today took some beer with chest palpitations denies hurting self

## 2021-12-13 NOTE — ED PROVIDER NOTE - OBJECTIVE STATEMENT
35-year-old male hx of anxiety, EtOH abuse/withdrawal, presenting with palpitations in the setting of EtOH withdrawal. States he drinks 1 bottle of alcohol per day. Last drink 4 hours ago. Has had palpitations and anxiety. No chest pain or shortness of breath. No other symptoms.

## 2021-12-13 NOTE — ED PROVIDER NOTE - CLINICAL SUMMARY MEDICAL DECISION MAKING FREE TEXT BOX
35-year-old male hx of anxiety, EtOH abuse/withdrawal, presenting with palpitations in the setting of EtOH withdrawal. Labs and CXR normal. Tachycardia resolved without intervention. PO librium written. Packet with information about detox centers provided. Discharged.

## 2021-12-13 NOTE — ED PROVIDER NOTE - PATIENT PORTAL LINK FT
You can access the FollowMyHealth Patient Portal offered by [facility] [place] by registering at the following website: [email or web address]. By joining [employer]’s FollowMyHealth portal, you will also be able to view your health information using other applications (apps) compatible with our system.

## 2021-12-13 NOTE — ED ADULT NURSE NOTE - ED STAT RN HANDOFF DETAILS
pt.remained stable. denies pain.re-eval by  with order to  d/c  home.left in the ed instable condition with steady gait.not in distress

## 2021-12-13 NOTE — ED ADULT NURSE NOTE - OBJECTIVE STATEMENT
hx of anxiety, EtOH abuse/withdrawal, presenting with palpitations in the setting of EtOH withdrawal. States he drinks 1 bottle of alcohol per day.bld.drawn and sent to lab

## 2021-12-13 NOTE — ED PROVIDER NOTE - NSFOLLOWUPINSTRUCTIONS_ED_ALL_ED_FT
You were seen in the emergency department for: alcohol withdrawal  Your diagnosis for this visit was: alcohol withdrawal  Your results report is attached.  Please see attached paperwork about detox centers.     You may be contacted by the Emergency Department Referrals Coordinator to set up your follow-up appointment within 24-48 hours of your discharge, Monday to Friday. We recommend you follow up with: your primary care doctor.      Please return to the Emergency Department if you experience any of the following symptoms:   - Shortness of breath or trouble breathing  - Pressure, pain or tightness in the chest  - Face drooping, arm weakness or speech difficulty  - Persistence of severe vomiting  - Head injury or loss of consciousness  - Nonstop bleeding or an open wound    (1) Follow up with your primary care physician within the next 24-48 hours as discussed. In addition, we did not find evidence of a life threatening illness on your testing here today, but listed below are the specialists that will be necessary to see as an outpatient to continue the workup.  Please call the numbers listed below or [phone] to set up the necessary appointments.  (2) Take Tylenol (up to 1000mg or 1 g)  and/or Motrin (up to 600mg) up to every 6 hours as needed for pain.   (3) If you had an IV (intravenous) line placed, it was removed. Sometimes, after IV removal, that area can be tender for a few days; if it develops redness and swelling, those could be signs of infection; in which case, return to the Emergency Department for assessment.  (4) Please continue taking all of your home medications as directed.

## 2021-12-14 PROBLEM — F41.9 ANXIETY DISORDER, UNSPECIFIED: Chronic | Status: ACTIVE | Noted: 2021-12-02

## 2021-12-14 PROBLEM — F32.9 MAJOR DEPRESSIVE DISORDER, SINGLE EPISODE, UNSPECIFIED: Chronic | Status: ACTIVE | Noted: 2021-12-02

## 2024-05-27 NOTE — PATIENT PROFILE ADULT - HOME ACCESSIBILITY CONCERNS
"Procedure: EGD    Diagnosis: Abdominal pain left upper quadrant right upper quadrant, GERD    Procedure Timin-6 weeks    *If within 4 weeks selected, please marty as high priority*    *If greater than 12 weeks, please select "5-12 weeks" and delay sending until 3 months prior to requested date*    Location: [address]    Additional Scheduling Information: No scheduling concerns    Prep Specifications:Standard prep    Is the patient taking a GLP-1 Agonist:no    Have you attached a patient to this message: yes   "
none

## 2024-12-25 NOTE — PATIENT PROFILE ADULT - FUNCTIONAL ASSESSMENT - BASIC MOBILITY SECTION LABEL
Needs appointment with Dr. Valero to discuss treatment options.  Please schedule with Dr. Valero to discuss. . no